# Patient Record
Sex: MALE | Race: WHITE
[De-identification: names, ages, dates, MRNs, and addresses within clinical notes are randomized per-mention and may not be internally consistent; named-entity substitution may affect disease eponyms.]

---

## 2017-04-12 ENCOUNTER — HOSPITAL ENCOUNTER (OUTPATIENT)
Dept: HOSPITAL 47 - ORWHC2ENDO | Age: 22
Discharge: HOME | End: 2017-04-12
Attending: SURGERY
Payer: COMMERCIAL

## 2017-04-12 VITALS — DIASTOLIC BLOOD PRESSURE: 75 MMHG | HEART RATE: 60 BPM | SYSTOLIC BLOOD PRESSURE: 107 MMHG

## 2017-04-12 VITALS — RESPIRATION RATE: 18 BRPM

## 2017-04-12 VITALS — BODY MASS INDEX: 21.5 KG/M2

## 2017-04-12 VITALS — TEMPERATURE: 98.2 F

## 2017-04-12 DIAGNOSIS — J45.909: ICD-10-CM

## 2017-04-12 DIAGNOSIS — F41.9: ICD-10-CM

## 2017-04-12 DIAGNOSIS — K29.50: ICD-10-CM

## 2017-04-12 DIAGNOSIS — K27.9: ICD-10-CM

## 2017-04-12 DIAGNOSIS — K44.9: ICD-10-CM

## 2017-04-12 DIAGNOSIS — K21.0: Primary | ICD-10-CM

## 2017-04-12 DIAGNOSIS — Z79.899: ICD-10-CM

## 2017-04-12 PROCEDURE — 88305 TISSUE EXAM BY PATHOLOGIST: CPT

## 2017-04-12 PROCEDURE — 88342 IMHCHEM/IMCYTCHM 1ST ANTB: CPT

## 2017-04-12 PROCEDURE — 43239 EGD BIOPSY SINGLE/MULTIPLE: CPT

## 2017-04-12 NOTE — P.OP
Date of Procedure: 04/12/17


Preoperative Diagnosis: 


GERD


Postoperative Diagnosis: 


GERD


Procedure(s) Performed: 


EGD


Anesthesia: MAC


Surgeon: Christiano Canales


Pathology: other (Antrum, esophagus)


Condition: stable


Disposition: PACU


Description of Procedure: 


Patient's placed on the endoscopy table in the lateral position.  He received 

IV sedation.  The gastroscope some placed oropharynx and passed into the 

esophagus and into the stomach.  Scope was then placed through the pylorus.  

The first and second portion of the duodenum appeared normal.  The scope was 

then brought back the antrum and this appeared mildly inflamed.  A biopsy 

antrum performed.  The scope was then retroflexed in the remainder of the 

stomach appeared normal.  There was a sliding hiatal hernia seen.  The GE 

junction was at 38 cm.  The distal esophagus appeared inflamed a biopsy was 

performed.  The proximal esophagus this appeared Normal.  Scope was withdrawn 

for patient.

## 2017-04-12 NOTE — P.GSHP
History of Present Illness


H&P Date: 04/12/17


Chief Complaint: Epigastric dull pain, peptic ulcer disease





This a 21-year-old male who has had complaints of epigastric abdominal pain.  

Patient also has had GERD.  He presents today for EGD to evaluate for GERD and 

peptic ulcer disease.





- Constitutional


Constitutional: Reports as per HPI





Past Medical History


Past Medical History: Asthma, GERD/Reflux


Additional Past Medical History / Comment(s): ENVIRONMENTAL ALLERGIES


History of Any Multi-Drug Resistant Organisms: None Reported


Past Surgical History: Adenoidectomy, Cholecystectomy, Hernia Repair, 

Tonsillectomy


Additional Past Surgical History / Comment(s): HERNIA X 2.  SINUS SX


Past Anesthesia/Blood Transfusion Reactions: Postoperative Nausea & Vomiting (

PONV)


Past Psychological History: Anxiety


Smoking Status: Never smoker


Past Alcohol Use History: Occasional


Past Drug Use History: Marijuana


Additional Drug Use History / Comment(s): USES MEDICAL MARIJUANA DAILY FOR 

ANXIETY-INSTRUCTED TO REFRAIN FROM USE FOR AT LEAST 24 HOURS PRIOR TO PROCEDURE





- Past Family History


  ** Mother


Family Medical History: No Reported History





Medications and Allergies


 Home Medications











 Medication  Instructions  Recorded  Confirmed  Type


 


Albuterol Inhaler [Ventolin Hfa 1 - 2 puff INHALATION Q6HR PRN 04/06/17 04/12/ 17 History





Inhaler]    


 


Loratadine [Claritin] 10 mg PO DAILY 04/06/17 04/12/17 History


 


Ranitidine HCl [Zantac] 150 mg PO BID 04/06/17 04/12/17 History











 Allergies











Allergy/AdvReac Type Severity Reaction Status Date / Time


 


No Known Allergies Allergy   Verified 04/12/17 13:29














Surgical - Exam


 Vital Signs











Temp Pulse BP Pulse Ox


 


 98.2 F   52 L  110/68   100 


 


 04/12/17 13:31  04/12/17 13:31  04/12/17 13:31  04/12/17 13:31














- General


well developed, no distress





- Eyes


PERRL





- ENT


normal pinna





- Neck


no masses





- Respiratory


normal expansion





- Cardiovascular


Rhythm: regular





- Abdomen


Abdomen: soft, non tender





Assessment and Plan


Plan: 


GERD, peptic ulcer disease.  Patient will undergo EGD today.

## 2017-05-18 ENCOUNTER — HOSPITAL ENCOUNTER (INPATIENT)
Dept: HOSPITAL 47 - 2ORWHC | Age: 22
LOS: 16 days | Discharge: HOME | DRG: 328 | End: 2017-06-03
Attending: SURGERY | Admitting: SURGERY
Payer: COMMERCIAL

## 2017-05-18 VITALS — BODY MASS INDEX: 20 KG/M2

## 2017-05-18 DIAGNOSIS — F41.9: ICD-10-CM

## 2017-05-18 DIAGNOSIS — K44.9: ICD-10-CM

## 2017-05-18 DIAGNOSIS — Z90.49: ICD-10-CM

## 2017-05-18 DIAGNOSIS — F12.90: ICD-10-CM

## 2017-05-18 DIAGNOSIS — Z87.891: ICD-10-CM

## 2017-05-18 DIAGNOSIS — Z79.51: ICD-10-CM

## 2017-05-18 DIAGNOSIS — R13.10: ICD-10-CM

## 2017-05-18 DIAGNOSIS — J45.909: ICD-10-CM

## 2017-05-18 DIAGNOSIS — F41.0: ICD-10-CM

## 2017-05-18 DIAGNOSIS — K21.0: Primary | ICD-10-CM

## 2017-05-18 DIAGNOSIS — Z79.899: ICD-10-CM

## 2017-05-18 PROCEDURE — 74210 X-RAY XM PHRNX&/CRV ESOPH C+: CPT

## 2017-06-02 VITALS — RESPIRATION RATE: 16 BRPM

## 2017-06-02 PROCEDURE — 0DV44ZZ RESTRICTION OF ESOPHAGOGASTRIC JUNCTION, PERCUTANEOUS ENDOSCOPIC APPROACH: ICD-10-PCS

## 2017-06-02 RX ADMIN — MEPERIDINE HYDROCHLORIDE ONE MG: 50 INJECTION, SOLUTION INTRAMUSCULAR; INTRAVENOUS; SUBCUTANEOUS at 08:55

## 2017-06-02 RX ADMIN — ENOXAPARIN SODIUM SCH: 40 INJECTION SUBCUTANEOUS at 10:02

## 2017-06-02 RX ADMIN — POTASSIUM CHLORIDE SCH MLS: 14.9 INJECTION, SOLUTION INTRAVENOUS at 06:42

## 2017-06-02 RX ADMIN — HYDROMORPHONE HYDROCHLORIDE PRN MG: 1 INJECTION, SOLUTION INTRAMUSCULAR; INTRAVENOUS; SUBCUTANEOUS at 10:41

## 2017-06-02 RX ADMIN — DEXTROSE MONOHYDRATE, SODIUM CHLORIDE, AND POTASSIUM CHLORIDE SCH MLS/HR: 50; 4.5; 1.49 INJECTION, SOLUTION INTRAVENOUS at 10:02

## 2017-06-02 RX ADMIN — DEXTROSE MONOHYDRATE, SODIUM CHLORIDE, AND POTASSIUM CHLORIDE SCH MLS/HR: 50; 4.5; 1.49 INJECTION, SOLUTION INTRAVENOUS at 21:45

## 2017-06-02 RX ADMIN — MEPERIDINE HYDROCHLORIDE ONE MG: 50 INJECTION, SOLUTION INTRAMUSCULAR; INTRAVENOUS; SUBCUTANEOUS at 08:50

## 2017-06-02 RX ADMIN — HYDROMORPHONE HYDROCHLORIDE PRN MG: 1 INJECTION, SOLUTION INTRAMUSCULAR; INTRAVENOUS; SUBCUTANEOUS at 21:50

## 2017-06-02 RX ADMIN — DEXTROSE MONOHYDRATE, SODIUM CHLORIDE, AND POTASSIUM CHLORIDE SCH MLS/HR: 50; 4.5; 1.49 INJECTION, SOLUTION INTRAVENOUS at 10:41

## 2017-06-02 NOTE — P.GSHP
History of Present Illness


H&P Date: 06/02/17


Chief Complaint: GERD





This a 21-year-old male referred from Dr. ricardo.  The patient has had long-

standing problems with reflux esophagitis.  The patient underwent recent EGD is 

found have evidence of esophagitis.  Patient has been well informed on the 

procedure of laparoscopic Nissen fundoplication.  The patient is aware the risk 

of the conversion to the open procedure, risk of injury to the stomach, liver 

and spleen.  The patient is also a risk of recurrent GERD and dysphagia 

symptoms.  The patient understands there is a postoperative diet of full 

liquids for 2 weeks after surgery.





- Constitutional


Constitutional: Reports as per HPI





Past Medical History


Past Medical History: Asthma, GERD/Reflux


Additional Past Medical History / Comment(s): ENVIRONMENTAL ALLERGIES.  HIATAL 

HERNIA.


History of Any Multi-Drug Resistant Organisms: None Reported


Past Surgical History: Adenoidectomy, Cholecystectomy, Hernia Repair, 

Tonsillectomy


Additional Past Surgical History / Comment(s): HERNIA X 2.  EGD 4/2017.  SINUS 

SX


Past Anesthesia/Blood Transfusion Reactions: Family History of Problems w/ 

Anesthesia, Postoperative Nausea & Vomiting (PONV)


Additional Past Anesthesia/Blood Transfusion Reaction / Comment(s): BROTHER HAS 

PONV.


Past Psychological History: Anxiety, Panic Disorder


Additional Psychological History / Comment(s): HX PANIC ATTACKS.


Smoking Status: Former smoker


Past Alcohol Use History: Occasional


Additional Past Alcohol Use History / Comment(s): SMOKED 2-3 YEARS SOCIALLY.


Past Drug Use History: Marijuana


Additional Drug Use History / Comment(s): USES MEDICAL MARIJUANA DAILY FOR 

ANXIETY-INSTRUCTED TO REFRAIN FROM USE FOR AT LEAST 24 HOURS PRIOR TO PROCEDURE





- Past Family History


  ** Mother


Family Medical History: No Reported History





Medications and Allergies


 Home Medications











 Medication  Instructions  Recorded  Confirmed  Type


 


Albuterol Inhaler [Ventolin Hfa 1 - 2 puff INHALATION Q6HR PRN 04/06/17 05/30/ 17 History





Inhaler]    


 


Loratadine [Claritin] 10 mg PO DAILY 04/06/17 05/30/17 History


 


Ranitidine HCl [Zantac] 150 mg PO BID 04/06/17 05/30/17 History











 Allergies











Allergy/AdvReac Type Severity Reaction Status Date / Time


 


No Known Allergies Allergy   Verified 05/30/17 12:35














Surgical - Exam


 Vital Signs











Temp Pulse Resp BP Pulse Ox


 


 96.8 F L  63   16   124/71   99 


 


 06/02/17 06:39  06/02/17 06:39  06/02/17 06:39  06/02/17 06:39  06/02/17 06:39














- General


well developed, no distress





- Eyes


PERRL





- ENT


normal pinna





- Neck


no masses





- Respiratory


normal expansion





- Cardiovascular


Rhythm: regular





- Abdomen


Abdomen: soft, non tender





Assessment and Plan


Plan: 





GERD.  We'll perform laparoscopic Nissen fundal plication.

## 2017-06-02 NOTE — FL
Single contrast esophagram

EXAMINATION TYPE: FL esophagus cervic/pharynx

 

DATE OF EXAM: 6/2/2017 12:19 PM

 

COMPARISON: NONE

 

CLINICAL HISTORY: Status post Leon fundoplication

 

The patient ingested contrast without difficulty or delay.  Noted are changes of Leon fundoplicatio
n.  There is no evidence for leak or obstruction. No residual contrast within the distal esophagus.  
  

 

IMPRESSION: Post-surgical change of Leon fundoplication without evidence for leak or obstruction.

## 2017-06-02 NOTE — P.OP
Date of Procedure: 06/02/17


Preoperative Diagnosis: 


GERD


Postoperative Diagnosis: 


GERD


Procedure(s) Performed: 


Laparoscopic Nissen fundoplication


Implants: 





Anesthesia: MATA


Surgeon: Christiano Canales


Estimated Blood Loss (ml): 5


Pathology: none sent


Condition: stable


Disposition: PACU


Indications for Procedure: 





Operative Findings: 





Description of Procedure: 


The patient was placed on the operating table in the supine position.  The 

patient received general anesthesia.  And was placed in dorsal lithotomy 

position.  The patient was prepped and draped in the usual sterile fashion.  

The skin incision sites were anesthetized with 1% local Xylocaine.  The skin 

was incised in the left periumbilical area and then using a blade less 5 mm 

trocar under direct visualization panel cavity was entered.  After adequate 

insufflation the laparoscope was then placed into the peritoneal cavity.  Next 

a 5 mm trochars placed in the right epigastric position.  Another 5 millimeter 

trocar the right lateral position.  Another 5 millimeter trocar in the left 

lateral position a 5 mm trocar is placed in the left epigastric position.  And 

then the initial 5 mm trocar was exchanged for a 10 mm trocar.  The left 

lateral lobe liver was retracted.  The hernia was seen.  The crural defect was 

then dissected using the Harmonic scissors device.  A 360 crural dissection 

was performed the esophagus stomach was reduced back into the peritoneal 

Cavity.  The crural defect was then closed using 2-0 Ethibond suture.  Next the 

fundus of the stomach was mobilized using the Loranger scissors device. and then 

a 58-Togolese bougie dilator was placed oropharynx passed into the esophagus and 

stomach the fundal plication wrap was then performed by grasping the fundus 

posteriorly and bringing it around the esophagus and stomach fundoplication was 

then performed using 2-0 Ethibond suture.  Care was taken that the fundal 

location rested over top of the intra-abdominal esophagus.  There was no injury 

seen to the stomach or esophagus.  The dilator was then withdrawn.  The abdomen 

was irrigated there is no bleeding seen.  The trochars were then withdrawn and 

then skin incision sites were closed using 3-0 Monocryl suture Steri-Strips are 

applied.  Patient thought procedure well and sent to recovery room in stable 

condition.

## 2017-06-03 VITALS — HEART RATE: 60 BPM | TEMPERATURE: 97.9 F | SYSTOLIC BLOOD PRESSURE: 120 MMHG | DIASTOLIC BLOOD PRESSURE: 58 MMHG

## 2017-06-03 RX ADMIN — ENOXAPARIN SODIUM SCH MG: 40 INJECTION SUBCUTANEOUS at 09:44

## 2017-06-03 RX ADMIN — POTASSIUM CHLORIDE SCH: 14.9 INJECTION, SOLUTION INTRAVENOUS at 00:05

## 2017-06-03 RX ADMIN — HYDROMORPHONE HYDROCHLORIDE PRN MG: 1 INJECTION, SOLUTION INTRAMUSCULAR; INTRAVENOUS; SUBCUTANEOUS at 06:59

## 2017-06-03 RX ADMIN — DEXTROSE MONOHYDRATE, SODIUM CHLORIDE, AND POTASSIUM CHLORIDE SCH MLS/HR: 50; 4.5; 1.49 INJECTION, SOLUTION INTRAVENOUS at 05:01

## 2018-05-11 ENCOUNTER — HOSPITAL ENCOUNTER (OUTPATIENT)
Dept: HOSPITAL 47 - RADFLMAIN | Age: 23
Discharge: HOME | End: 2018-05-11
Attending: SURGERY
Payer: COMMERCIAL

## 2018-05-11 DIAGNOSIS — R13.10: Primary | ICD-10-CM

## 2018-05-11 PROCEDURE — 74220 X-RAY XM ESOPHAGUS 1CNTRST: CPT

## 2018-05-17 NOTE — FL
ESOPHOGRAM.

 

HISTORY: Dysphagia. There is been prior hiatal hernia repair.

 

COMPARISON: 6/2/2017

 

4oz EZHD,2oz EZ Paque, 1pkg EZGas. 42sec fluoro time

 

 

 

Esophagram was performed per the air contrast technique.  The patient swallowed barium and effervesce
nt crystals without difficulty or delay. 

 

 Esophageal peristalsis and motility appear to be within normal limits.  

 

There is no evidence for filling defect, mass or diverticulum.  

 

There is no evidence for recurrent hiatal hernia. As the operative changes noted of prior a hiatal he
rnia repair.

 

Subsequently single contrast cervical esophagram was performed which fails demonstrate evidence for a
spiration penetration or mass. 

 

IMPRESSION:

1. Hiatal hernia repair without recurrent hiatal hernia at this time.

2. Otherwise unremarkable examination.

## 2025-07-11 ENCOUNTER — HOSPITAL ENCOUNTER (OUTPATIENT)
Dept: HOSPITAL 47 - RADUSWWP | Age: 30
Discharge: HOME | End: 2025-07-11
Attending: FAMILY MEDICINE
Payer: COMMERCIAL

## 2025-07-11 DIAGNOSIS — N50.812: Primary | ICD-10-CM

## 2025-07-11 PROCEDURE — 76870 US EXAM SCROTUM: CPT

## 2025-07-11 PROCEDURE — 93975 VASCULAR STUDY: CPT
